# Patient Record
Sex: MALE | Race: WHITE | NOT HISPANIC OR LATINO | URBAN - METROPOLITAN AREA
[De-identification: names, ages, dates, MRNs, and addresses within clinical notes are randomized per-mention and may not be internally consistent; named-entity substitution may affect disease eponyms.]

---

## 2018-05-03 NOTE — PATIENT DISCUSSION
Advised patient that I will send him back to Dr. Vignesh Bishop for IOP and inflammation control. Discussed with patient that Dr. Vignesh Bishop will discuss a lens implant in the future. I will see him PRN.

## 2020-06-16 ENCOUNTER — IMPORTED ENCOUNTER (OUTPATIENT)
Dept: URBAN - METROPOLITAN AREA CLINIC 31 | Facility: CLINIC | Age: 20
End: 2020-06-16

## 2020-06-17 ENCOUNTER — IMPORTED ENCOUNTER (OUTPATIENT)
Dept: URBAN - METROPOLITAN AREA CLINIC 31 | Facility: CLINIC | Age: 20
End: 2020-06-17

## 2020-06-17 PROBLEM — H10.022: Noted: 2020-06-17

## 2020-06-17 PROCEDURE — 99203 OFFICE O/P NEW LOW 30 MIN: CPT

## 2020-06-17 NOTE — PATIENT DISCUSSION
Bacterial Conjunctivitis OS -- Possible allergic response to Polytrim. The condition was discussed with the patient. TD suspension QID x10 days. 6x/day for first two days.

## 2022-04-02 ASSESSMENT — VISUAL ACUITY
OS_CC: 20/200
OS_SC: 20/20
OD_SC: 20/20
OD_CC: 20/400